# Patient Record
Sex: FEMALE | Race: BLACK OR AFRICAN AMERICAN | NOT HISPANIC OR LATINO | Employment: OTHER | URBAN - METROPOLITAN AREA
[De-identification: names, ages, dates, MRNs, and addresses within clinical notes are randomized per-mention and may not be internally consistent; named-entity substitution may affect disease eponyms.]

---

## 2019-08-19 ENCOUNTER — HOSPITAL ENCOUNTER (EMERGENCY)
Facility: MEDICAL CENTER | Age: 64
End: 2019-08-19
Attending: EMERGENCY MEDICINE
Payer: COMMERCIAL

## 2019-08-19 ENCOUNTER — APPOINTMENT (OUTPATIENT)
Dept: RADIOLOGY | Facility: MEDICAL CENTER | Age: 64
End: 2019-08-19
Attending: EMERGENCY MEDICINE
Payer: COMMERCIAL

## 2019-08-19 VITALS
HEIGHT: 72 IN | RESPIRATION RATE: 18 BRPM | BODY MASS INDEX: 25.73 KG/M2 | HEART RATE: 70 BPM | SYSTOLIC BLOOD PRESSURE: 157 MMHG | WEIGHT: 190 LBS | DIASTOLIC BLOOD PRESSURE: 70 MMHG | OXYGEN SATURATION: 95 % | TEMPERATURE: 97.1 F

## 2019-08-19 DIAGNOSIS — S82.832A OTHER CLOSED FRACTURE OF DISTAL END OF LEFT FIBULA, INITIAL ENCOUNTER: ICD-10-CM

## 2019-08-19 PROCEDURE — 73610 X-RAY EXAM OF ANKLE: CPT | Mod: LT

## 2019-08-19 PROCEDURE — 73630 X-RAY EXAM OF FOOT: CPT | Mod: RT

## 2019-08-19 PROCEDURE — 29515 APPLICATION SHORT LEG SPLINT: CPT

## 2019-08-19 PROCEDURE — A9270 NON-COVERED ITEM OR SERVICE: HCPCS | Performed by: EMERGENCY MEDICINE

## 2019-08-19 PROCEDURE — 302874 HCHG BANDAGE ACE 2 OR 3""

## 2019-08-19 PROCEDURE — 302875 HCHG BANDAGE ACE 4 OR 6""

## 2019-08-19 PROCEDURE — 73590 X-RAY EXAM OF LOWER LEG: CPT | Mod: LT

## 2019-08-19 PROCEDURE — 99285 EMERGENCY DEPT VISIT HI MDM: CPT

## 2019-08-19 PROCEDURE — 700102 HCHG RX REV CODE 250 W/ 637 OVERRIDE(OP): Performed by: EMERGENCY MEDICINE

## 2019-08-19 RX ORDER — IBUPROFEN 600 MG/1
600 TABLET ORAL ONCE
Status: COMPLETED | OUTPATIENT
Start: 2019-08-19 | End: 2019-08-19

## 2019-08-19 RX ORDER — OXYCODONE HYDROCHLORIDE AND ACETAMINOPHEN 5; 325 MG/1; MG/1
1 TABLET ORAL ONCE
Status: COMPLETED | OUTPATIENT
Start: 2019-08-19 | End: 2019-08-19

## 2019-08-19 RX ORDER — AMLODIPINE BESYLATE 5 MG/1
5 TABLET ORAL DAILY
COMMUNITY

## 2019-08-19 RX ORDER — OXYCODONE HYDROCHLORIDE AND ACETAMINOPHEN 5; 325 MG/1; MG/1
1-2 TABLET ORAL EVERY 4 HOURS PRN
Qty: 18 TAB | Refills: 0 | Status: SHIPPED | OUTPATIENT
Start: 2019-08-19 | End: 2019-08-22

## 2019-08-19 RX ADMIN — IBUPROFEN 600 MG: 600 TABLET ORAL at 08:44

## 2019-08-19 RX ADMIN — OXYCODONE HYDROCHLORIDE AND ACETAMINOPHEN 1 TABLET: 5; 325 TABLET ORAL at 08:44

## 2019-08-19 ASSESSMENT — LIFESTYLE VARIABLES: DO YOU DRINK ALCOHOL: NO

## 2019-08-19 NOTE — ED PROVIDER NOTES
ED Provider Note    ED Provider Note    Primary care provider: No primary care provider on file.  Means of arrival: EMS  History obtained from: Patient    CHIEF COMPLAINT  Chief Complaint   Patient presents with   • T-5000 FALL     tripped going down stairs, twisted both ankles, fells down 5-6 stairs   • Ankle Injury     bilat, left worse than right, CMS intact     Seen at 8:20 AM.   HPI  Nicholas Swartz is a 64 y.o. female visiting from [facility] who presents to the Emergency Department with severe left ankle pain.  The patient was walking down steps at the hotel when she fell, inverting both ankles and fell down several steps.  She did not have any head trauma or loss of consciousness.  She denies any headache, neck pain, upper extremity pain, chest pain or abdominal pain.  She has been nonambulatory secondary to the severe left ankle pain.  She has sprained the ankles in the past but this feels different.  She did hear a crack at the initial event.  She received fentanyl in route by EMS and does have some significant improvement.  She also notes some right foot pain.    REVIEW OF SYSTEMS  See HPI,   Remainder of ROS negative.     PAST MEDICAL HISTORY   has a past medical history of Hypertension.    SURGICAL HISTORY   has a past surgical history that includes hysterectomy laparoscopy.    SOCIAL HISTORY  Social History     Tobacco Use   • Smoking status: Never Smoker   • Smokeless tobacco: Never Used   Substance Use Topics   • Alcohol use: Yes     Alcohol/week: 0.6 oz     Types: 1 Glasses of wine per week   • Drug use: Never      Social History     Substance and Sexual Activity   Drug Use Never       FAMILY HISTORY  History reviewed. No pertinent family history.    CURRENT MEDICATIONS  Reviewed.  See Encounter Summary.     ALLERGIES  Allergies   Allergen Reactions   • Amoxicillin Anaphylaxis   • Codeine Nausea       PHYSICAL EXAM  VITAL SIGNS: /70   Pulse 70   Temp 36.2 °C (97.1 °F) (Temporal)   Resp  18   Ht 1.829 m (6')   Wt 86.2 kg (190 lb)   SpO2 95%   BMI 25.77 kg/m²   Constitutional: Awake, alert in no apparent distress.  Pleasant, well-appearing.  HENT: Normocephalic, atraumatic.  Bilateral external ears normal. Nose normal. No midline cervical tenderness, Nexus Criteria Negative.   Eyes: Conjunctiva normal, non-icteric, EOMI.    Thorax & Lungs: Easy unlabored respirations, Clear to ascultation bilaterally.  Cardiovascular: Regular rate, Regular rhythm, No murmurs, rubs or gallops.  Abdomen:  Soft, nontender, nondistended, normal active bowel sounds.   :    Skin: Visualized skin is  Dry, No erythema, No rash.   Musculoskeletal: Right lower extremity: The right knee is atraumatic, the right lower leg is atraumatic as well, no malleolar tenderness about the ankle.  The patient has some slight ecchymosis over the dorsum of the right foot and some mild tenderness over the fifth metatarsal.  Left lower extremity: The patient has an atraumatic left knee.  The proximal lower leg is nontender, the patient has significant tenderness and swelling at the distal lower leg, there is ecchymosis anteriorly at the region of the tibial diaphysis extending down through the anterior foot.  There is significant swelling over the lateral malleolus.  This entire region is quite tender.  The foot is nontender.    Neurologic: Alert, Grossly non-focal.   Psychiatric: Normal affect, Normal mood  Lymphatic:  No cervical LAD  RADIOLOGY  DX-FOOT-COMPLETE 3+ RIGHT   Final Result      1.  No fracture or dislocation of RIGHT foot.   2.  Degenerative change of great toe with bunion formation.      DX-TIBIA AND FIBULA LEFT   Final Result      Oblique mildly displaced fracture of distal LEFT fibula with overlying soft tissue swelling.      DX-ANKLE 3+ VIEWS LEFT   Final Result      1.  Oblique mildly displaced fracture of distal LEFT fibula with overlying soft tissue swelling.   2.  No dislocation.            COURSE & MEDICAL DECISION  MAKING  Pertinent Labs & Imaging studies reviewed. (See chart for details)    Differential diagnoses include but are not limited to: Tib-fib fracture, ankle sprain    8:20 AM - Medical record reviewed, patient seen and examined at bedside.    In prescribing controlled substances to this patient, I certify that I have obtained and reviewed the medical history of Nicholas Swartz. I have also made a good yady effort to obtain applicable records from other providers who have treated the patient and no other records are available at this time.     I have conducted a physical exam and documented it. I have reviewed Ms. Swartz’s prescription history as maintained by the [place] Prescription Monitoring Program.     I have assessed the patient’s risk for abuse, dependency, and addiction using the validated Opioid Risk Tool available at https://www.mdcalc.com/ncgwlk-megm-yfdp-ort-narcotic-abuse.     Given the above, I believe the benefits of controlled substance therapy outweigh the risks. The reasons for prescribing controlled substances include non-narcotic, oral analgesic alternatives have been inadequate for pain control. Accordingly, I have discussed the risk and benefits, treatment plan, and alternative therapies with the patient.         Decision Making:  This is a 64 y.o. year old female who presents with a significant inversion injury of the left ankle.  X-ray is show a distal fibular fracture.  The alignment is normal.  There is no indication for reduction.  This should heal well without ORIF.  The patient is neurovascularly intact.  She is placed in a posterior slab with stirrups.  She should be toe-touch weightbearing until evaluated by orthopedics.  The patient is visiting from [place], therefore I did not give her the orthopedic surgeon number here as she is going to follow-up when she returns home.  Crutches were given.  The patient can be discharged.  She did not have any other injuries.  No sign of compartment  syndrome.    Discharge Medications:  Discharge Medication List as of 8/19/2019 10:54 AM      START taking these medications    Details   oxyCODONE-acetaminophen (PERCOCET) 5-325 MG Tab Take 1-2 Tabs by mouth every four hours as needed for up to 3 days., Disp-18 Tab, R-0, Print Rx Paper, For 3 days             The patient was discharged home (see d/c instructions) and parent was told to return immediately for any signs or symptoms listed, or any worsening at all.  The patient's parent verbally agreed to the discharge precautions and follow-up plan which is documented in EPIC.    The patient's blood pressure is elevated today. >120/80. I have referred them to primary care for follow up.       FINAL IMPRESSION  1. Other closed fracture of distal end of left fibula, initial encounter